# Patient Record
Sex: MALE
[De-identification: names, ages, dates, MRNs, and addresses within clinical notes are randomized per-mention and may not be internally consistent; named-entity substitution may affect disease eponyms.]

---

## 2022-07-25 ENCOUNTER — HOSPITAL ENCOUNTER (OUTPATIENT)
Dept: HOSPITAL 97 - OR | Age: 67
Discharge: HOME | End: 2022-07-25
Attending: OPHTHALMOLOGY
Payer: COMMERCIAL

## 2022-07-25 VITALS — OXYGEN SATURATION: 100 % | TEMPERATURE: 97.5 F | SYSTOLIC BLOOD PRESSURE: 125 MMHG | DIASTOLIC BLOOD PRESSURE: 64 MMHG

## 2022-07-25 DIAGNOSIS — Z20.822: ICD-10-CM

## 2022-07-25 DIAGNOSIS — H25.042: ICD-10-CM

## 2022-07-25 DIAGNOSIS — H25.011: ICD-10-CM

## 2022-07-25 DIAGNOSIS — H25.812: Primary | ICD-10-CM

## 2022-07-25 DIAGNOSIS — H43.813: ICD-10-CM

## 2022-07-25 DIAGNOSIS — H25.13: ICD-10-CM

## 2022-07-25 PROCEDURE — 87811 SARS-COV-2 COVID19 W/OPTIC: CPT

## 2022-07-25 PROCEDURE — 66984 XCAPSL CTRC RMVL W/O ECP: CPT

## 2022-07-25 PROCEDURE — 08RK3JZ REPLACEMENT OF LEFT LENS WITH SYNTHETIC SUBSTITUTE, PERCUTANEOUS APPROACH: ICD-10-PCS

## 2022-07-25 PROCEDURE — 36415 COLL VENOUS BLD VENIPUNCTURE: CPT

## 2022-07-25 RX ADMIN — PHENYLEPHRINE HYDROCHLORIDE ONE DROPS: 100 SOLUTION/ DROPS OPHTHALMIC at 13:21

## 2022-07-25 RX ADMIN — PHENYLEPHRINE HYDROCHLORIDE ONE DROPS: 100 SOLUTION/ DROPS OPHTHALMIC at 13:26

## 2022-07-25 NOTE — OP
ate of Procedure:  07/25/2022



Surgeon:  Kendra Street MD



Anesthesiologist:  Fernie Encarnacion CRNA and Yassine Betancourt MD.



Preoperative Diagnosis:  Combined form of cataract, left eye.



Operation Performed:  Phacoemulsification with intraocular lens implant, left 
eye.



Anesthesia:  Topical anesthesia, per cataract surgery.



Complications:  None.



Description Of Procedure:  In day surgery, Akten gel was placed in the left eye



In the operating room the patient was prepped and draped in the usual sterile 
fashion for ophthalmic surgery.  A lid speculum was placed in the left eye.  Two
paracentesis sites were made superiorly and inferiorly in the limbal cornea.  
Preservative free lidocaine then Viscoat were placed in the anterior chamber. A 
keratome was used to enter the anterior chamber.  A 360 degree capsulotomy was 
performed with utrata forceps.  The lens was hydrodissected with BSS and rotated
freely.  The lens was removed with a stop and chop technique.  19.52 phaco CDE 
was used to remove the lens.  Residual cortex was removed with the irrigation 
and aspiration.  Provisc was placed in the capsular bag.  A DCB00 +21.0 was 
placed in the capsular bag without complications.  Irrigation and aspiration was
used to remove residual viscoelastic.  The paracentesis sites were hydrated with
BSS.  The wound and paracentesis sites were inspected and found to be 
watertight.  Vigamox 0.07 cc was placed intracamerally at the end of the 
procedure. The eye was patched with a clear plastic shield. 





The patient was returned to day surgery in good condition.



Comments:  Lens was slightly loose and extra Viscoat was used.



Discharge Instructions:  Mr. Castrejon is discharged to home in good condition.  He 
is to follow up with Dr. Street in the morning.





UYNG/PATRICE

DD:  07/25/2022 16:13:56   Voice ID:  [id number]

DT:  07/25/2022 20:34:43   Report ID:  [id number]

JAMIE